# Patient Record
Sex: FEMALE | URBAN - METROPOLITAN AREA
[De-identification: names, ages, dates, MRNs, and addresses within clinical notes are randomized per-mention and may not be internally consistent; named-entity substitution may affect disease eponyms.]

---

## 2018-11-06 ENCOUNTER — APPOINTMENT (RX ONLY)
Dept: URBAN - METROPOLITAN AREA CLINIC 22 | Facility: CLINIC | Age: 28
Setting detail: DERMATOLOGY
End: 2018-11-06

## 2018-11-06 DIAGNOSIS — L83 ACANTHOSIS NIGRICANS: ICD-10-CM

## 2018-11-06 DIAGNOSIS — L20.89 OTHER ATOPIC DERMATITIS: ICD-10-CM

## 2018-11-06 DIAGNOSIS — D22 MELANOCYTIC NEVI: ICD-10-CM

## 2018-11-06 PROBLEM — D22.39 MELANOCYTIC NEVI OF OTHER PARTS OF FACE: Status: ACTIVE | Noted: 2018-11-06

## 2018-11-06 PROBLEM — L20.84 INTRINSIC (ALLERGIC) ECZEMA: Status: ACTIVE | Noted: 2018-11-06

## 2018-11-06 PROCEDURE — ? PRESCRIPTION

## 2018-11-06 PROCEDURE — ? COUNSELING

## 2018-11-06 PROCEDURE — 99203 OFFICE O/P NEW LOW 30 MIN: CPT

## 2018-11-06 RX ORDER — SELENIUM SULFIDE 1 %
SHAMPOO TOPICAL
Qty: 1 | Refills: 0 | Status: ERX | COMMUNITY
Start: 2018-11-06

## 2018-11-06 RX ORDER — MINOCYCLINE HYDROCHLORIDE 100 MG/1
CAPSULE ORAL BID
Qty: 90 | Refills: 0 | Status: ERX | COMMUNITY
Start: 2018-11-06

## 2018-11-06 RX ADMIN — MINOCYCLINE HYDROCHLORIDE: 100 CAPSULE ORAL at 21:23

## 2018-11-06 RX ADMIN — Medication: at 21:23

## 2018-11-06 ASSESSMENT — LOCATION DETAILED DESCRIPTION DERM
LOCATION DETAILED: LEFT AREOLA
LOCATION DETAILED: LEFT INFERIOR LATERAL FOREHEAD
LOCATION DETAILED: LEFT MEDIAL BREAST 7-8:00 REGION
LOCATION DETAILED: LEFT AXILLARY VAULT
LOCATION DETAILED: RIGHT AXILLARY VAULT
LOCATION DETAILED: RIGHT INFRAMAMMARY CREASE (INNER QUADRANT)
LOCATION DETAILED: RIGHT AREOLA

## 2018-11-06 ASSESSMENT — LOCATION ZONE DERM
LOCATION ZONE: FACE
LOCATION ZONE: AXILLAE
LOCATION ZONE: TRUNK

## 2018-11-06 ASSESSMENT — LOCATION SIMPLE DESCRIPTION DERM
LOCATION SIMPLE: RIGHT BREAST
LOCATION SIMPLE: LEFT FOREHEAD
LOCATION SIMPLE: LEFT BREAST
LOCATION SIMPLE: LEFT AXILLARY VAULT
LOCATION SIMPLE: RIGHT AXILLARY VAULT

## 2018-11-06 NOTE — HPI: RASH
What Type Of Note Output Would You Prefer (Optional)?: Standard Output
How Severe Is Your Rash?: mild
Is This A New Presentation, Or A Follow-Up?: Rash
Additional History: Medication helps but has not gone away.

## 2018-11-06 NOTE — PROCEDURE: COUNSELING
Detail Level: Detailed
Patient Specific Counseling (Will Not Stick From Patient To Patient): Use selsun blue shampoo in the shower daily leaving on for 5-7 minutes\\nTake minocycline twice daily
Detail Level: Zone

## 2018-11-13 ENCOUNTER — RX ONLY (OUTPATIENT)
Age: 28
Setting detail: RX ONLY
End: 2018-11-13

## 2018-11-13 RX ORDER — DOXYCYCLINE HYCLATE 100 MG/1
CAPSULE, GELATIN COATED ORAL
Qty: 120 | Refills: 0 | Status: ERX | COMMUNITY
Start: 2018-11-13

## 2018-11-14 ENCOUNTER — HOSPITAL ENCOUNTER (EMERGENCY)
Dept: HOSPITAL 8 - ED | Age: 28
Discharge: HOME | End: 2018-11-14
Payer: COMMERCIAL

## 2018-11-14 ENCOUNTER — RX ONLY (OUTPATIENT)
Age: 28
Setting detail: RX ONLY
End: 2018-11-14

## 2018-11-14 VITALS — SYSTOLIC BLOOD PRESSURE: 138 MMHG | DIASTOLIC BLOOD PRESSURE: 85 MMHG

## 2018-11-14 VITALS — BODY MASS INDEX: 28.98 KG/M2 | WEIGHT: 163.58 LBS | HEIGHT: 63 IN

## 2018-11-14 DIAGNOSIS — H93.13: Primary | ICD-10-CM

## 2018-11-14 PROCEDURE — 99281 EMR DPT VST MAYX REQ PHY/QHP: CPT

## 2018-11-14 RX ORDER — CALCIPOTRIENE 50 UG/G
CREAM TOPICAL
Qty: 1 | Refills: 1 | Status: ERX | COMMUNITY
Start: 2018-11-14

## 2018-11-14 RX ORDER — AZITHROMYCIN 250 MG/1
TABLET, FILM COATED ORAL
Qty: 30 | Refills: 0 | Status: ERX | COMMUNITY
Start: 2018-11-14

## 2019-01-10 ENCOUNTER — HOSPITAL ENCOUNTER (OUTPATIENT)
Dept: HOSPITAL 8 - LAB | Age: 29
Discharge: HOME | End: 2019-01-10
Attending: PHYSICIAN ASSISTANT
Payer: COMMERCIAL

## 2019-01-10 DIAGNOSIS — E03.9: Primary | ICD-10-CM

## 2019-01-10 LAB
T4 FREE SERPL-MCNC: 0.82 NG/DL (ref 0.76–1.46)
TSH SERPL-ACNC: 5.28 MIU/L (ref 0.36–3.74)

## 2019-01-10 PROCEDURE — 84439 ASSAY OF FREE THYROXINE: CPT

## 2019-01-10 PROCEDURE — 84443 ASSAY THYROID STIM HORMONE: CPT

## 2019-01-10 PROCEDURE — 36415 COLL VENOUS BLD VENIPUNCTURE: CPT

## 2019-01-10 PROCEDURE — 84480 ASSAY TRIIODOTHYRONINE (T3): CPT

## 2019-03-01 ENCOUNTER — HOSPITAL ENCOUNTER (OUTPATIENT)
Dept: HOSPITAL 8 - LAB | Age: 29
Discharge: HOME | End: 2019-03-01
Attending: PHYSICIAN ASSISTANT
Payer: COMMERCIAL

## 2019-03-01 DIAGNOSIS — E03.9: Primary | ICD-10-CM

## 2019-03-01 PROCEDURE — 84443 ASSAY THYROID STIM HORMONE: CPT

## 2019-03-01 PROCEDURE — 36415 COLL VENOUS BLD VENIPUNCTURE: CPT

## 2019-04-26 ENCOUNTER — HOSPITAL ENCOUNTER (OUTPATIENT)
Dept: HOSPITAL 8 - LAB | Age: 29
Discharge: HOME | End: 2019-04-26
Attending: PHYSICIAN ASSISTANT
Payer: COMMERCIAL

## 2019-04-26 DIAGNOSIS — E03.9: Primary | ICD-10-CM

## 2019-04-26 DIAGNOSIS — Z88.8: ICD-10-CM

## 2019-04-26 LAB
T4 FREE SERPL-MCNC: 1.73 NG/DL (ref 0.76–1.46)
TSH SERPL-ACNC: 0.01 MIU/L (ref 0.36–3.74)

## 2019-04-26 PROCEDURE — 84480 ASSAY TRIIODOTHYRONINE (T3): CPT

## 2019-04-26 PROCEDURE — 84443 ASSAY THYROID STIM HORMONE: CPT

## 2019-04-26 PROCEDURE — 36415 COLL VENOUS BLD VENIPUNCTURE: CPT

## 2019-04-26 PROCEDURE — 84439 ASSAY OF FREE THYROXINE: CPT

## 2019-06-13 ENCOUNTER — HOSPITAL ENCOUNTER (OUTPATIENT)
Dept: HOSPITAL 8 - LAB | Age: 29
Discharge: HOME | End: 2019-06-13
Attending: PHYSICIAN ASSISTANT
Payer: COMMERCIAL

## 2019-06-13 DIAGNOSIS — E03.9: Primary | ICD-10-CM

## 2019-06-13 LAB
T4 FREE SERPL-MCNC: 1.54 NG/DL (ref 0.76–1.46)
TSH SERPL-ACNC: 0.05 MIU/L (ref 0.36–3.74)

## 2019-06-13 PROCEDURE — 84480 ASSAY TRIIODOTHYRONINE (T3): CPT

## 2019-06-13 PROCEDURE — 84439 ASSAY OF FREE THYROXINE: CPT

## 2019-06-13 PROCEDURE — 36415 COLL VENOUS BLD VENIPUNCTURE: CPT

## 2019-06-13 PROCEDURE — 84443 ASSAY THYROID STIM HORMONE: CPT

## 2024-10-05 ENCOUNTER — HOSPITAL ENCOUNTER (OUTPATIENT)
Dept: LAB | Facility: MEDICAL CENTER | Age: 34
End: 2024-10-05
Attending: FAMILY MEDICINE
Payer: COMMERCIAL

## 2024-10-08 ENCOUNTER — HOSPITAL ENCOUNTER (OUTPATIENT)
Facility: MEDICAL CENTER | Age: 34
End: 2024-10-08
Attending: FAMILY MEDICINE
Payer: COMMERCIAL

## 2024-10-08 PROCEDURE — 87045 FECES CULTURE AEROBIC BACT: CPT

## 2024-10-08 PROCEDURE — 87177 OVA AND PARASITES SMEARS: CPT

## 2024-10-08 PROCEDURE — 87046 STOOL CULTR AEROBIC BACT EA: CPT

## 2024-10-08 PROCEDURE — 87899 AGENT NOS ASSAY W/OPTIC: CPT

## 2024-10-08 PROCEDURE — 87209 SMEAR COMPLEX STAIN: CPT

## 2024-10-11 LAB
BACTERIA STL CULT: NORMAL
E COLI SXT1+2 STL IA: NORMAL
SIGNIFICANT IND 70042: NORMAL
SITE SITE: NORMAL
SOURCE SOURCE: NORMAL

## 2024-10-15 LAB — OVA AND PARASITE, FECAL INTERPRETATION Q0595: NEGATIVE

## 2024-10-17 ENCOUNTER — HOSPITAL ENCOUNTER (OUTPATIENT)
Facility: MEDICAL CENTER | Age: 34
End: 2024-10-17
Attending: FAMILY MEDICINE
Payer: COMMERCIAL

## 2024-10-23 ENCOUNTER — HOSPITAL ENCOUNTER (OUTPATIENT)
Facility: MEDICAL CENTER | Age: 34
End: 2024-10-23
Attending: FAMILY MEDICINE
Payer: COMMERCIAL

## 2024-10-23 LAB — HEMOCCULT STL QL: POSITIVE

## 2024-10-23 PROCEDURE — 82272 OCCULT BLD FECES 1-3 TESTS: CPT

## 2025-01-16 ENCOUNTER — OCCUPATIONAL MEDICINE (OUTPATIENT)
Dept: URGENT CARE | Facility: CLINIC | Age: 35
End: 2025-01-16
Payer: COMMERCIAL

## 2025-01-16 VITALS
HEIGHT: 63 IN | RESPIRATION RATE: 19 BRPM | OXYGEN SATURATION: 97 % | DIASTOLIC BLOOD PRESSURE: 72 MMHG | SYSTOLIC BLOOD PRESSURE: 134 MMHG | TEMPERATURE: 97.6 F | BODY MASS INDEX: 28.74 KG/M2 | HEART RATE: 116 BPM | WEIGHT: 162.2 LBS

## 2025-01-16 DIAGNOSIS — M25.572 ACUTE LEFT ANKLE PAIN: ICD-10-CM

## 2025-01-16 PROCEDURE — 3078F DIAST BP <80 MM HG: CPT | Performed by: STUDENT IN AN ORGANIZED HEALTH CARE EDUCATION/TRAINING PROGRAM

## 2025-01-16 PROCEDURE — 99203 OFFICE O/P NEW LOW 30 MIN: CPT | Performed by: STUDENT IN AN ORGANIZED HEALTH CARE EDUCATION/TRAINING PROGRAM

## 2025-01-16 PROCEDURE — 3075F SYST BP GE 130 - 139MM HG: CPT | Performed by: STUDENT IN AN ORGANIZED HEALTH CARE EDUCATION/TRAINING PROGRAM

## 2025-01-16 RX ORDER — LOSARTAN POTASSIUM 100 MG/1
100 TABLET ORAL DAILY
COMMUNITY

## 2025-01-16 RX ORDER — LEVOTHYROXINE SODIUM 75 UG/1
TABLET ORAL
COMMUNITY

## 2025-01-16 ASSESSMENT — FIBROSIS 4 INDEX: FIB4 SCORE: 0.69
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
## 2025-01-17 ENCOUNTER — TELEPHONE (OUTPATIENT)
Dept: URGENT CARE | Facility: CLINIC | Age: 35
End: 2025-01-17
Payer: COMMERCIAL
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
## 2025-01-20 ENCOUNTER — OCCUPATIONAL MEDICINE (OUTPATIENT)
Dept: URGENT CARE | Facility: CLINIC | Age: 35
End: 2025-01-20
Payer: COMMERCIAL

## 2025-01-20 VITALS
WEIGHT: 162 LBS | DIASTOLIC BLOOD PRESSURE: 78 MMHG | HEIGHT: 63 IN | BODY MASS INDEX: 28.7 KG/M2 | RESPIRATION RATE: 16 BRPM | HEART RATE: 101 BPM | OXYGEN SATURATION: 97 % | TEMPERATURE: 98.8 F | SYSTOLIC BLOOD PRESSURE: 116 MMHG

## 2025-01-20 DIAGNOSIS — S99.912D INJURY OF LEFT ANKLE, SUBSEQUENT ENCOUNTER: ICD-10-CM

## 2025-01-20 PROCEDURE — 99213 OFFICE O/P EST LOW 20 MIN: CPT

## 2025-01-20 PROCEDURE — 3074F SYST BP LT 130 MM HG: CPT

## 2025-01-20 PROCEDURE — 3078F DIAST BP <80 MM HG: CPT

## 2025-01-20 ASSESSMENT — FIBROSIS 4 INDEX: FIB4 SCORE: 0.69
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
## 2025-03-13 ENCOUNTER — HOSPITAL ENCOUNTER (OUTPATIENT)
Dept: LAB | Facility: MEDICAL CENTER | Age: 35
End: 2025-03-13
Attending: FAMILY MEDICINE
Payer: COMMERCIAL

## 2025-03-13 LAB
EST. AVERAGE GLUCOSE BLD GHB EST-MCNC: 123 MG/DL
HBA1C MFR BLD: 5.9 % (ref 4–5.6)

## 2025-03-13 PROCEDURE — 84481 FREE ASSAY (FT-3): CPT

## 2025-03-13 PROCEDURE — 80061 LIPID PANEL: CPT

## 2025-03-13 PROCEDURE — 84443 ASSAY THYROID STIM HORMONE: CPT

## 2025-03-13 PROCEDURE — 83036 HEMOGLOBIN GLYCOSYLATED A1C: CPT

## 2025-03-13 PROCEDURE — 36415 COLL VENOUS BLD VENIPUNCTURE: CPT

## 2025-03-13 PROCEDURE — 84439 ASSAY OF FREE THYROXINE: CPT

## 2025-03-13 PROCEDURE — 82306 VITAMIN D 25 HYDROXY: CPT

## 2025-03-14 ENCOUNTER — APPOINTMENT (OUTPATIENT)
Dept: RADIOLOGY | Facility: MEDICAL CENTER | Age: 35
End: 2025-03-14
Attending: EMERGENCY MEDICINE
Payer: COMMERCIAL

## 2025-03-14 ENCOUNTER — HOSPITAL ENCOUNTER (EMERGENCY)
Facility: MEDICAL CENTER | Age: 35
End: 2025-03-14
Attending: EMERGENCY MEDICINE
Payer: COMMERCIAL

## 2025-03-14 ENCOUNTER — OFFICE VISIT (OUTPATIENT)
Dept: URGENT CARE | Facility: CLINIC | Age: 35
End: 2025-03-14
Payer: COMMERCIAL

## 2025-03-14 VITALS
OXYGEN SATURATION: 96 % | DIASTOLIC BLOOD PRESSURE: 81 MMHG | SYSTOLIC BLOOD PRESSURE: 125 MMHG | WEIGHT: 162.7 LBS | BODY MASS INDEX: 29.76 KG/M2 | RESPIRATION RATE: 15 BRPM | TEMPERATURE: 97.8 F | HEART RATE: 78 BPM

## 2025-03-14 VITALS
BODY MASS INDEX: 29.63 KG/M2 | OXYGEN SATURATION: 99 % | HEART RATE: 67 BPM | TEMPERATURE: 98.1 F | HEIGHT: 62 IN | SYSTOLIC BLOOD PRESSURE: 106 MMHG | WEIGHT: 161 LBS | DIASTOLIC BLOOD PRESSURE: 68 MMHG | RESPIRATION RATE: 16 BRPM

## 2025-03-14 DIAGNOSIS — R42 DIZZINESS: ICD-10-CM

## 2025-03-14 DIAGNOSIS — R42 LIGHTHEADEDNESS: ICD-10-CM

## 2025-03-14 DIAGNOSIS — R51.9 ACUTE NONINTRACTABLE HEADACHE, UNSPECIFIED HEADACHE TYPE: ICD-10-CM

## 2025-03-14 DIAGNOSIS — R41.89 BRAIN FOG: ICD-10-CM

## 2025-03-14 DIAGNOSIS — R26.89 LOSS OF BALANCE: ICD-10-CM

## 2025-03-14 DIAGNOSIS — R52 BODY ACHES: ICD-10-CM

## 2025-03-14 DIAGNOSIS — R27.8 DECREASED COORDINATION: ICD-10-CM

## 2025-03-14 DIAGNOSIS — R00.2 PALPITATIONS: ICD-10-CM

## 2025-03-14 LAB
25(OH)D3 SERPL-MCNC: 95 NG/ML (ref 30–100)
ALBUMIN SERPL BCP-MCNC: 4.3 G/DL (ref 3.2–4.9)
ALBUMIN/GLOB SERPL: 1.4 G/DL
ALP SERPL-CCNC: 65 U/L (ref 30–99)
ALT SERPL-CCNC: 13 U/L (ref 2–50)
ANION GAP SERPL CALC-SCNC: 12 MMOL/L (ref 7–16)
AST SERPL-CCNC: 22 U/L (ref 12–45)
BASOPHILS # BLD AUTO: 0.5 % (ref 0–1.8)
BASOPHILS # BLD: 0.04 K/UL (ref 0–0.12)
BILIRUB SERPL-MCNC: 0.3 MG/DL (ref 0.1–1.5)
BUN SERPL-MCNC: 17 MG/DL (ref 8–22)
CALCIUM ALBUM COR SERPL-MCNC: 9.5 MG/DL (ref 8.5–10.5)
CALCIUM SERPL-MCNC: 9.7 MG/DL (ref 8.5–10.5)
CHLORIDE SERPL-SCNC: 105 MMOL/L (ref 96–112)
CHOLEST SERPL-MCNC: 179 MG/DL (ref 100–199)
CO2 SERPL-SCNC: 24 MMOL/L (ref 20–33)
CREAT SERPL-MCNC: 0.81 MG/DL (ref 0.5–1.4)
EKG IMPRESSION: NORMAL
EOSINOPHIL # BLD AUTO: 0.23 K/UL (ref 0–0.51)
EOSINOPHIL NFR BLD: 2.8 % (ref 0–6.9)
ERYTHROCYTE [DISTWIDTH] IN BLOOD BY AUTOMATED COUNT: 36.7 FL (ref 35.9–50)
FASTING STATUS PATIENT QL REPORTED: NORMAL
GFR SERPLBLD CREATININE-BSD FMLA CKD-EPI: 96 ML/MIN/1.73 M 2
GLOBULIN SER CALC-MCNC: 3 G/DL (ref 1.9–3.5)
GLUCOSE SERPL-MCNC: 110 MG/DL (ref 65–99)
HCG SERPL QL: NEGATIVE
HCT VFR BLD AUTO: 42.5 % (ref 37–47)
HDLC SERPL-MCNC: 55 MG/DL
HGB BLD-MCNC: 14.3 G/DL (ref 12–16)
IMM GRANULOCYTES # BLD AUTO: 0.03 K/UL (ref 0–0.11)
IMM GRANULOCYTES NFR BLD AUTO: 0.4 % (ref 0–0.9)
LDLC SERPL CALC-MCNC: 97 MG/DL
LYMPHOCYTES # BLD AUTO: 2.17 K/UL (ref 1–4.8)
LYMPHOCYTES NFR BLD: 26.1 % (ref 22–41)
MAGNESIUM SERPL-MCNC: 2.2 MG/DL (ref 1.5–2.5)
MCH RBC QN AUTO: 28.5 PG (ref 27–33)
MCHC RBC AUTO-ENTMCNC: 33.6 G/DL (ref 32.2–35.5)
MCV RBC AUTO: 84.8 FL (ref 81.4–97.8)
MONOCYTES # BLD AUTO: 0.55 K/UL (ref 0–0.85)
MONOCYTES NFR BLD AUTO: 6.6 % (ref 0–13.4)
NEUTROPHILS # BLD AUTO: 5.3 K/UL (ref 1.82–7.42)
NEUTROPHILS NFR BLD: 63.6 % (ref 44–72)
NRBC # BLD AUTO: 0 K/UL
NRBC BLD-RTO: 0 /100 WBC (ref 0–0.2)
NT-PROBNP SERPL IA-MCNC: <36 PG/ML (ref 0–125)
PLATELET # BLD AUTO: 302 K/UL (ref 164–446)
PMV BLD AUTO: 10 FL (ref 9–12.9)
POTASSIUM SERPL-SCNC: 4 MMOL/L (ref 3.6–5.5)
PROT SERPL-MCNC: 7.3 G/DL (ref 6–8.2)
RBC # BLD AUTO: 5.01 M/UL (ref 4.2–5.4)
SODIUM SERPL-SCNC: 141 MMOL/L (ref 135–145)
T3FREE SERPL-MCNC: 3.43 PG/ML (ref 2–4.4)
T4 FREE SERPL-MCNC: 1.93 NG/DL (ref 0.93–1.7)
TRIGL SERPL-MCNC: 134 MG/DL (ref 0–149)
TROPONIN T SERPL-MCNC: <6 NG/L (ref 6–19)
TSH SERPL DL<=0.005 MIU/L-ACNC: 1.52 UIU/ML (ref 0.38–5.33)
TSH SERPL-ACNC: 1.41 UIU/ML (ref 0.35–5.5)
WBC # BLD AUTO: 8.3 K/UL (ref 4.8–10.8)

## 2025-03-14 PROCEDURE — 94760 N-INVAS EAR/PLS OXIMETRY 1: CPT

## 2025-03-14 PROCEDURE — 83735 ASSAY OF MAGNESIUM: CPT

## 2025-03-14 PROCEDURE — 85025 COMPLETE CBC W/AUTO DIFF WBC: CPT

## 2025-03-14 PROCEDURE — 36415 COLL VENOUS BLD VENIPUNCTURE: CPT

## 2025-03-14 PROCEDURE — 84484 ASSAY OF TROPONIN QUANT: CPT

## 2025-03-14 PROCEDURE — 99214 OFFICE O/P EST MOD 30 MIN: CPT

## 2025-03-14 PROCEDURE — 93005 ELECTROCARDIOGRAM TRACING: CPT | Mod: TC | Performed by: EMERGENCY MEDICINE

## 2025-03-14 PROCEDURE — 99283 EMERGENCY DEPT VISIT LOW MDM: CPT

## 2025-03-14 PROCEDURE — 93005 ELECTROCARDIOGRAM TRACING: CPT | Mod: TC

## 2025-03-14 PROCEDURE — 3078F DIAST BP <80 MM HG: CPT

## 2025-03-14 PROCEDURE — 80053 COMPREHEN METABOLIC PANEL: CPT

## 2025-03-14 PROCEDURE — 3074F SYST BP LT 130 MM HG: CPT

## 2025-03-14 PROCEDURE — 84443 ASSAY THYROID STIM HORMONE: CPT

## 2025-03-14 PROCEDURE — 93000 ELECTROCARDIOGRAM COMPLETE: CPT

## 2025-03-14 PROCEDURE — 84703 CHORIONIC GONADOTROPIN ASSAY: CPT

## 2025-03-14 PROCEDURE — 83880 ASSAY OF NATRIURETIC PEPTIDE: CPT

## 2025-03-14 PROCEDURE — 71045 X-RAY EXAM CHEST 1 VIEW: CPT

## 2025-03-14 RX ORDER — ONDANSETRON 4 MG/1
4 TABLET, ORALLY DISINTEGRATING ORAL
COMMUNITY
Start: 2024-09-16

## 2025-03-14 RX ORDER — RIZATRIPTAN BENZOATE 10 MG/1
TABLET ORAL
COMMUNITY
Start: 2024-09-25

## 2025-03-14 RX ORDER — CLOTRIMAZOLE AND BETAMETHASONE DIPROPIONATE 10; .64 MG/G; MG/G
CREAM TOPICAL
COMMUNITY

## 2025-03-14 RX ORDER — CIPROFLOXACIN 500 MG/1
1 TABLET, FILM COATED ORAL 2 TIMES DAILY
COMMUNITY

## 2025-03-14 RX ORDER — MECLIZINE HYDROCHLORIDE 25 MG/1
TABLET ORAL
COMMUNITY

## 2025-03-14 RX ORDER — PHENAZOPYRIDINE HYDROCHLORIDE 200 MG/1
TABLET, FILM COATED ORAL
COMMUNITY

## 2025-03-14 RX ORDER — NITROFURANTOIN 25; 75 MG/1; MG/1
1 CAPSULE ORAL 2 TIMES DAILY
COMMUNITY

## 2025-03-14 RX ORDER — SUCRALFATE 1 G/1
TABLET ORAL
COMMUNITY

## 2025-03-14 RX ORDER — OMEPRAZOLE 40 MG/1
CAPSULE, DELAYED RELEASE ORAL
COMMUNITY
Start: 2024-10-23

## 2025-03-14 ASSESSMENT — ENCOUNTER SYMPTOMS: DIZZINESS: 1

## 2025-03-14 ASSESSMENT — FIBROSIS 4 INDEX
FIB4 SCORE: 0.69
FIB4 SCORE: 0.69

## 2025-03-14 NOTE — PROGRESS NOTES
Subjective:   Jyoti Rey is a 34 y.o. female who presents for Dizziness (Dizziness, loss of balance, brain fog x 6 days)    Patient presents to the clinic for complaints of dizziness, body aches, loss of balance, palpitations, headaches, and brain fog x 6 days.  Patient has subjective weakness of the lower extremities as well.   Patient had one bout of palpitations that lasted 20 min 2 days ago when her BP went up 170s SBP. This has not recurred since.   Patient has a history of hypothyroidism. States that whenever her T4 levels are too high or too low, she experiences these symptoms. She had her T4 drawn yesterday and showed supra-therapeutic level of T4 secondary to her Levothyroxine. Took her daily synthroid today.   Has taken no medications for her current symptoms.   Patient denies chest pain, SOB, nausea/vomiting/diarrhea, difficulty breathing or swallowing, palpitations or racing heart rate,       Dizziness        Review of Systems   Neurological:  Positive for dizziness.     Refer to HPI for additional details.    During this visit, appropriate PPE was worn, and hand hygiene was performed.    PMH:  has no past medical history on file.    MEDS:   Current Outpatient Medications:     losartan (COZAAR) 100 MG Tab, Take 100 mg by mouth every day., Disp: , Rfl:     levothyroxine (SYNTHROID) 75 MCG Tab, , Disp: , Rfl:     ciprofloxacin (CIPRO) 500 MG Tab, Take 1 Tablet by mouth 2 times a day. (Patient not taking: Reported on 3/14/2025), Disp: , Rfl:     clotrimazole-betamethasone (LOTRISONE) 1-0.05 % Cream, APPLY CREAM TOPICALLY TO AFFECTED AREA TWICE DAILY FOR 2 WEEKS (Patient not taking: Reported on 3/14/2025), Disp: , Rfl:     esomeprazole (NEXIUM) 20 MG capsule, 1 capsule 1/2 to 1 hour before morning meal Orally Once a day for 30 day(s) (Patient not taking: Reported on 3/14/2025), Disp: , Rfl:     meclizine (ANTIVERT) 25 MG Tab, TAKE 1 TABLET BY MOUTH EVERY 8 HOURS AS NEEDED FOR DIZZINESS  "(Patient not taking: Reported on 3/14/2025), Disp: , Rfl:     nitrofurantoin (MACROBID) 100 MG Cap, Take 1 Capsule by mouth 2 times a day. (Patient not taking: Reported on 3/14/2025), Disp: , Rfl:     omeprazole (PRILOSEC) 40 MG delayed-release capsule, 1 capsule 1/2 to 1 hour before morning meal Orally Once a day for 30 days (Patient not taking: Reported on 3/14/2025), Disp: , Rfl:     ondansetron (ZOFRAN ODT) 4 MG TABLET DISPERSIBLE, Take 4 mg by mouth. (Patient not taking: Reported on 3/14/2025), Disp: , Rfl:     phenazopyridine (PYRIDIUM) 200 MG Tab, TAKE 1 TABLET BY MOUTH THREE TIMES DAILY FOR 2 DAYS (Patient not taking: Reported on 3/14/2025), Disp: , Rfl:     rizatriptan (MAXALT) 10 MG tablet, 1 tablet Orally Once a day As needed for headache (Patient not taking: Reported on 3/14/2025), Disp: , Rfl:     sucralfate (CARAFATE) 1 GM Tab, TAKE 1 TABLET BY MOUTH THREE TIMES DAILY FOR 5 DAYS (Patient not taking: Reported on 3/14/2025), Disp: , Rfl:     ALLERGIES:   Allergies   Allergen Reactions    Doxycycline Unspecified     tinnitus    Minocycline      SURGHX: History reviewed. No pertinent surgical history.  SOCHX:  reports that she has never smoked. She has never used smokeless tobacco. She reports that she does not drink alcohol and does not use drugs.    FH: Per HPI as applicable/pertinent.    Medications, Allergies, and current problem list reviewed today in Epic.     Objective:     /68 (BP Location: Left arm, Patient Position: Sitting, BP Cuff Size: Adult)   Pulse 67   Temp 36.7 °C (98.1 °F) (Temporal)   Resp 16   Ht 1.575 m (5' 2\")   Wt 73 kg (161 lb)   SpO2 99%     Physical Exam  Constitutional:       Appearance: Normal appearance. She is not ill-appearing or toxic-appearing.   HENT:      Head: Normocephalic.      Right Ear: Tympanic membrane, ear canal and external ear normal.      Left Ear: Tympanic membrane, ear canal and external ear normal.      Nose: Nose normal. No congestion or " rhinorrhea.      Mouth/Throat:      Mouth: Mucous membranes are moist.      Pharynx: Oropharynx is clear. No oropharyngeal exudate or posterior oropharyngeal erythema.   Eyes:      General:         Right eye: No discharge.         Left eye: No discharge.      Extraocular Movements: Extraocular movements intact.      Conjunctiva/sclera: Conjunctivae normal.      Pupils: Pupils are equal, round, and reactive to light.   Cardiovascular:      Rate and Rhythm: Normal rate and regular rhythm.      Pulses: Normal pulses.      Heart sounds: Normal heart sounds. No murmur heard.  Pulmonary:      Effort: Pulmonary effort is normal. No respiratory distress.      Breath sounds: Normal breath sounds. No wheezing or rhonchi.   Abdominal:      General: Abdomen is flat.   Musculoskeletal:         General: No signs of injury. Normal range of motion.      Cervical back: Normal range of motion. No rigidity.      Comments: Bilateral lower extremities with 5/5 strength at the knee, hip, and ankle joint, full ROM throughout as well.  2+ DTRs throughout bilateral lower extremities.  Gait is normal and coordinated.  Negative for any signs of weakness.   Lymphadenopathy:      Cervical: No cervical adenopathy.   Skin:     General: Skin is warm and dry.   Neurological:      General: No focal deficit present.      Mental Status: She is alert and oriented to person, place, and time. Mental status is at baseline.      Cranial Nerves: No cranial nerve deficit.      Motor: No weakness.      Coordination: Coordination normal.      Gait: Gait normal.      Deep Tendon Reflexes: Reflexes normal.      Comments: Thought content, mood, cognition, judgment, gait, and coordination all WNL throughout visit.   Psychiatric:         Mood and Affect: Mood normal.         Behavior: Behavior normal.         Thought Content: Thought content normal.         Judgment: Judgment normal.         Assessment/Plan:     Diagnosis and associated orders:     1.  Palpitations  - EKG - Clinic Performed    2. Lightheadedness  - CT-HEAD W/O; Future    3. Decreased coordination  - CT-HEAD W/O; Future    Other orders  - ciprofloxacin (CIPRO) 500 MG Tab; Take 1 Tablet by mouth 2 times a day. (Patient not taking: Reported on 3/14/2025)  - clotrimazole-betamethasone (LOTRISONE) 1-0.05 % Cream; APPLY CREAM TOPICALLY TO AFFECTED AREA TWICE DAILY FOR 2 WEEKS (Patient not taking: Reported on 3/14/2025)  - esomeprazole (NEXIUM) 20 MG capsule; 1 capsule 1/2 to 1 hour before morning meal Orally Once a day for 30 day(s) (Patient not taking: Reported on 3/14/2025)  - meclizine (ANTIVERT) 25 MG Tab; TAKE 1 TABLET BY MOUTH EVERY 8 HOURS AS NEEDED FOR DIZZINESS (Patient not taking: Reported on 3/14/2025)  - nitrofurantoin (MACROBID) 100 MG Cap; Take 1 Capsule by mouth 2 times a day. (Patient not taking: Reported on 3/14/2025)  - omeprazole (PRILOSEC) 40 MG delayed-release capsule; 1 capsule 1/2 to 1 hour before morning meal Orally Once a day for 30 days (Patient not taking: Reported on 3/14/2025)  - ondansetron (ZOFRAN ODT) 4 MG TABLET DISPERSIBLE; Take 4 mg by mouth. (Patient not taking: Reported on 3/14/2025)  - phenazopyridine (PYRIDIUM) 200 MG Tab; TAKE 1 TABLET BY MOUTH THREE TIMES DAILY FOR 2 DAYS (Patient not taking: Reported on 3/14/2025)  - rizatriptan (MAXALT) 10 MG tablet; 1 tablet Orally Once a day  As needed for headache (Patient not taking: Reported on 3/14/2025)  - sucralfate (CARAFATE) 1 GM Tab; TAKE 1 TABLET BY MOUTH THREE TIMES DAILY FOR 5 DAYS (Patient not taking: Reported on 3/14/2025)     Comments/MDM:     Discussed that likely etiology of the patient's symptoms is lightheadedness, palpitations, and changes to coordination likely secondary to her elevated T4 levels versus other etiologies.  Stat head CT ordered and scheduled for the patient for further workup and imaging regarding the dizziness and lightheadedness.  EKG performed in clinic to workup patient's palpitations,  which showed normal sinus rhythm without ectopy, signs of acute coronary events, or arrhythmia/dysrhythmia.  Instructed patient to stop her Synthroid at this time until she follows up with her PCP.  Discussed consideration of sending patient to the emergency department for higher level of care, workup, and treatment at this time.  Shared decision making with patient concluded with deferment and refusal of ER send of the patient at this time per patient's wishes.  Will continue workup outpatient with very low threshold for going to the ER moving forward.  Patient agreeable to this plan of care.  Strict ER precautions reviewed with patient regarding red flag symptoms listed below.  Instructed patient to follow-up with her PCP as soon as possible as well as to RTC or go to the ER if her symptoms persist or worsen.  Patient agreeable to this plan of care.  All questions answered.  Return to clinic if symptoms persist or worsen.  Red flag symptoms warranting emergency medical services discussed, including but not limited to chest pain, SOB, wheezing, difficulty breathing or swallowing, sensation of throat closing or mass in the throat, severe intractable headache, changes to vision or hearing, recurrence of palpitations or racing heart rate, abdominal pain, fever greater than 103F despite medication management, persistent or worsened coordination changes, persistent or worsened dizziness/lightheadedness/vertigo, loss of consciousness, drowsiness or lethargy, or nausea/vomiting/diarrhea.           Differential diagnosis, natural history, supportive care, and indications for immediate follow-up discussed.    Advised the patient to follow-up with the primary care physician for recheck, reevaluation, and consideration of further management.    Instructed patient to seek emergency medical attention via calling EMS or going to the Emergency Room for red flag symptoms, including but not limited to: chest pain, palpitations,  fever greater than 103F, shortness of breath, wheezing, new or worsened numbness/tingling, focal or unilateral weakness, and bloody vomit/stool.     Please note that this dictation was created using voice recognition software. I have made a reasonable attempt to correct obvious errors, but I expect that there are errors of grammar and possibly content that I did not discover before finalizing the note.    This note was electronically signed by JOSÉ Garay

## 2025-03-14 NOTE — ED TRIAGE NOTES
"Pt comes in complaining of dizziness, body aches, loss of balance, palpitations, headaches, and brain fog x 6 days. Pt went to urgent care today who sent her here for possible \"thyroid storm\"   "

## 2025-03-15 NOTE — ED NOTES
PT ambulated to RD [address] with a steady gate. C/C is dizziness, body aches, loss of balance, palpitations, headaches . Pt is in a gown, on the monitor, and call light is within reach. Chart up for ERP.

## 2025-03-15 NOTE — ED NOTES
PIV d/c. Pt provided d/c instructions and verbalizes understanding with no further questions. Home care instructions explained. Pt ambulatory to ED luh

## 2025-03-15 NOTE — ED PROVIDER NOTES
ED Provider Note    CHIEF COMPLAINT  Chief Complaint   Patient presents with    Sent from Urgent Care        [facility]    Primary care provider: Gildardo Gastelum M.D.   History obtained from: Patient  History limited by: None     Jyoti Venus Rey is a 34 y.o. female who presents to the ED with  complaining of dizziness, body aches, loss of balance, palpitations, brain fog for 6 days.  She currently denies any pain except some pain to the back of her head and neck.  She reports having palpitations that lasted about 20 minutes 2 days ago.  She denies fever/shortness of breath or difficulty breathing/nausea/vomiting/diarrhea/dysuria.  She denies possibility of pregnancy.  Patient reports history of hypothyroidism and has been taking her medication as prescribed.  Patient went to urgent care today for her symptoms and was recommended to come to the ED but she declined.    REVIEW OF SYSTEMS  Please see HPI for pertinent positives/negatives.  All other systems reviewed and are negative.     PAST MEDICAL HISTORY  No past medical history on file.     SURGICAL HISTORY  No past surgical history on file.     SOCIAL HISTORY  Social History     Tobacco Use    Smoking status: Never    Smokeless tobacco: Never   Vaping Use    Vaping status: Never Used   Substance and Sexual Activity    Alcohol use: Never    Drug use: Never    Sexual activity: Not on file        FAMILY HISTORY  No family history on file.     CURRENT MEDICATIONS  Home Medications    **Home medications have not yet been reviewed for this encounter**          ALLERGIES  Allergies   Allergen Reactions    Doxycycline Unspecified     tinnitus    Minocycline         PHYSICAL EXAM  VITAL SIGNS: /81   Pulse 78   Temp 36.6 °C (97.8 °F) (Temporal)   Resp 15   Wt 73.8 kg (162 lb 11.2 oz)   SpO2 96%   BMI 29.76 kg/m²  @CONTRERAS[[id number]::@     Pulse ox interpretation: 96% I interpret this pulse ox as  normal     Cardiac monitor interpretation: Sinus rhythm with heart rate in the 70s as interpreted by me.  The patient presented with dizziness and cardiac monitor was ordered to monitor for dysrhythmia.    Constitutional: Well developed, well nourished, alert in no apparent distress, nontoxic appearance    HENT: No external signs of trauma, normocephalic, oropharynx moist and clear   Eyes: PERRL, EOMI, vision and visual fields are grossly intact bilaterally, conjunctiva without erythema, no discharge, no icterus    Neck: Soft and supple, trachea midline, no stridor, no tenderness, no LAD, good ROM    Cardiovascular: Regular rate and rhythm, no murmurs/rubs/gallops, strong distal pulses and good perfusion    Thorax & Lungs: No respiratory distress, CTAB    Abdomen: Soft, nontender, nondistended, no guarding, no rebound, normal BS    Back: No CVAT     Extremities: No cyanosis, no edema, no gross deformity, good ROM, intact distal pulses with brisk cap refill    Skin: Warm, dry, no pallor/cyanosis, no rash noted      Neuro: Alert and oriented to person, place, and time.  GCS 15.  CN II-XII grossly intact.  Normal speech.  Equal strength bilateral UE/LE.  Sensation intact to touch.  No cerebellar signs including rapid alternating hand movements, finger-to-nose and heel-to-shin bilaterally  Psychiatric: Cooperative      NIH STROKE SCALE    1A: Level of Consciousness=0  Alert; keenly responsive 0  Arouses to minor stimulation +1  Requires repeated stimulation to arouse +2  Movements to pain +2  Postures or unresponsive +3    1B: Ask Month and Age=0  Both questions right 0  1 question right +1  0 questions right +2  Dysarthric/intubated/trauma/language barrier +1  Aphasic +2    1C: 'Blink Eyes' & 'Squeeze Hands'=0  (Pantomime commands if communication barrier)  Performs both tasks 0  Performs 1 task +1  Performs 0 tasks +2    2: Test Horizontal Extraocular Movements=0  Normal 0  Partial gaze palsy: can be overcome  +1  Partial gaze palsy: corrects with oculocephalic reflex +1  Forced gaze palsy: cannot be overcome +2    3: Test Visual Fields=0  No visual loss 0  Partial hemianopia +1  Complete hemianopia +2  Patient is bilaterally blind +3  Bilateral hemianopia +3    4: Test Facial Palsy=0  (Use grimace if obtunded)  Normal symmetry 0  Minor paralysis (flat nasolabial fold, smile asymetry) +1  Partial paralysis (lower face) +2  Unilateral complete paralysis (upper/lower face) +3  Bilateral complete paralysis (upper/lower face) +3    5A: Test Left Arm Motor Drift=0  No drift for 10 Seconds 0  Drift, but doesn't hit bed +1  Drift, hits bed +2  Some effort against gravity +2  No effort against gravity +3  No movement +4  Amputation/joint fusion 0    5B: Test Right Arm Motor Drift=0  No drift for 10 seconds 0  Drift, but doesn't hit bed +1  Drift, hits bed +2  Some effort against gravity +2  No effort against gravity +3  No movement +4  Amputation/joint fusion 0    6A: Test Left Leg Motor Drift=0  No drift for 5 Seconds 0  Drift, but doesn't hit bed +1  Drift, hits bed +2  Some effort against gravity +2  No effort against gravity +3  No movement +4  Amputation/joint fusion 0    6B: Test Right Leg Motor Drift=0  No drift for 5 Seconds 0  Drift, but doesn't hit bed +1  Drift, hits bed +2  Some effort against gravity +2  No effort against gravity +3  No movement +4  Amputation/joint fusion 0    7: Test Limb Ataxia =0  (FNF/Heel-Shin)  No ataxia 0  Ataxia in 1 limb +1  Ataxia in 2 limbs +2  Does not understand 0  Paralyzed 0  Amputation/joint fusion 0    8: Test Sensation=0  Normal; no sensory loss 0  Mild-moderate loss: less sharp/more dull +1  Mild-moderate loss: can sense being touched +1  Complete loss: cannot sense being touched at all +2  No response and quadriplegic +2  Coma/unresponsive +2    9: Test Language/Aphasia=0  Normal; no aphasia 0  Mild-moderate aphasia: some obvious changes, without significant limitation +1  Severe  aphasia: fragmentary expression, inference needed, cannot identify materials +2  Mute/global aphasia: no usable speech/auditory comprehension +3  Coma/unresponsive +3    10: Test Dysarthria=0  Normal 0  Mild-moderate dysarthria: slurring but can be understood +1  Severe dysarthria: unintelligble slurring or out of proportion to dysphasia +2  Mute/anarthric +2  Intubated/unable to test 0    11: Test Extinction/Inattention=0  No abnormality 0  Visual/tactile/auditory/spatial/personal inattention +1  Extinction to bilateral simultaneous stimulation +1  Profound maury-inattention (ex: does not recognize own hand) +2  Extinction to >1 modality +2      NIH Stroke Scale=0        DIAGNOSTIC STUDIES / PROCEDURES    EKG  12 Lead EKG obtained at 1633 and interpreted by me:   Rate: 73   Rhythm: Sinus rhythm   Ectopy: None  Intervals: Normal   Axis: Normal   QRS: Late precordial R wave transition, low voltage precordial leads  ST segments: Normal  T Waves: Normal    Clinical Impression: Sinus rhythm without acute ischemic changes or dysrhythmia       LABS  All labs reviewed by me.     Results for orders placed or performed during the hospital encounter of 25   EKG    Collection Time: 25  4:33 PM   Result Value Ref Range    Report       [facility] Emergency Dept[facility]    Test Date:  2025  Pt Name:    NELLY SOUSA              Department: ER  MRN:        [id number]                      Room:  Gender:     Female                       Technician: [id number]  :        1990                   Requested By:ER TRIAGE PROTOCOL  Order #:    [id number]                    Reading MD:    Measurements  Intervals                                Axis  Rate:       73                           P:          56  OH:         159                          QRS:        42  QRSD:       86                           T:          8  QT:         385  QTc:        425    Interpretive Statements  Sinus rhythm  Probable left atrial  enlargement  Low voltage, precordial leads  Borderline T abnormalities, anterior leads  No previous ECG available for comparison     COMP METABOLIC PANEL    Collection Time: 03/14/25  6:10 PM   Result Value Ref Range    Sodium 141 135 - 145 mmol/L    Potassium 4.0 3.6 - 5.5 mmol/L    Chloride 105 96 - 112 mmol/L    Co2 24 20 - 33 mmol/L    Anion Gap 12.0 7.0 - 16.0    Glucose 110 (H) 65 - 99 mg/dL    Bun 17 8 - 22 mg/dL    Creatinine 0.81 0.50 - 1.40 mg/dL    Calcium 9.7 8.5 - 10.5 mg/dL    Correct Calcium 9.5 8.5 - 10.5 mg/dL    AST(SGOT) 22 12 - 45 U/L    ALT(SGPT) 13 2 - 50 U/L    Alkaline Phosphatase 65 30 - 99 U/L    Total Bilirubin 0.3 0.1 - 1.5 mg/dL    Albumin 4.3 3.2 - 4.9 g/dL    Total Protein 7.3 6.0 - 8.2 g/dL    Globulin 3.0 1.9 - 3.5 g/dL    A-G Ratio 1.4 g/dL   TROPONIN    Collection Time: 03/14/25  6:10 PM   Result Value Ref Range    Troponin T <6 6 - 19 ng/L   proBrain Natriuretic Peptide, NT    Collection Time: 03/14/25  6:10 PM   Result Value Ref Range    NT-proBNP <36 0 - 125 pg/mL   MAGNESIUM    Collection Time: 03/14/25  6:10 PM   Result Value Ref Range    Magnesium 2.2 1.5 - 2.5 mg/dL   BETA-HCG QUALITATIVE SERUM    Collection Time: 03/14/25  6:10 PM   Result Value Ref Range    Beta-Hcg Qualitative Serum Negative Negative   TSH WITH REFLEX TO FT4    Collection Time: 03/14/25  6:10 PM   Result Value Ref Range    TSH 1.520 0.380 - 5.330 uIU/mL   CBC WITH DIFFERENTIAL    Collection Time: 03/14/25  6:10 PM   Result Value Ref Range    WBC 8.3 4.8 - 10.8 K/uL    RBC 5.01 4.20 - 5.40 M/uL    Hemoglobin 14.3 12.0 - 16.0 g/dL    Hematocrit 42.5 37.0 - 47.0 %    MCV 84.8 81.4 - 97.8 fL    MCH 28.5 27.0 - 33.0 pg    MCHC 33.6 32.2 - 35.5 g/dL    RDW 36.7 35.9 - 50.0 fL    Platelet Count 302 164 - 446 K/uL    MPV 10.0 9.0 - 12.9 fL    Neutrophils-Polys 63.60 44.00 - 72.00 %    Lymphocytes 26.10 22.00 - 41.00 %    Monocytes 6.60 0.00 - 13.40 %    Eosinophils 2.80 0.00 - 6.90 %    Basophils 0.50 0.00 - 1.80  %    Immature Granulocytes 0.40 0.00 - 0.90 %    Nucleated RBC 0.00 0.00 - 0.20 /100 WBC    Neutrophils (Absolute) 5.30 1.82 - 7.42 K/uL    Lymphs (Absolute) 2.17 1.00 - 4.80 K/uL    Monos (Absolute) 0.55 0.00 - 0.85 K/uL    Eos (Absolute) 0.23 0.00 - 0.51 K/uL    Baso (Absolute) 0.04 0.00 - 0.12 K/uL    Immature Granulocytes (abs) 0.03 0.00 - 0.11 K/uL    NRBC (Absolute) 0.00 K/uL   ESTIMATED GFR    Collection Time: 03/14/25  6:10 PM   Result Value Ref Range    GFR (CKD-EPI) 96 >60 mL/min/1.73 m 2        RADIOLOGY  I have independently interpreted the diagnostic imaging associated with this visit and am waiting the final reading from the radiologist.   My preliminary interpretation is as follows: No acute findings on portable chest x-ray.    DX-CHEST-PORTABLE (1 VIEW)   Final Result      No evidence of acute cardiopulmonary process.             COURSE & MEDICAL DECISION MAKING  Nursing notes, VS, PMSFHx reviewed in chart.     Review of past medical records shows the patient was seen at urgent care today for her symptoms.  Patient declined recommendation to come to the ED for evaluation.      Differential diagnoses considered include but are not limited to: Atrial fib/flutter, SVT, ventricular tachycardia, WPW, Brugada sydrome, prolonged QT syndrome, PAC, PVC, AMI, CHF, valvular heart disease, thyroid disorder, electrolyte abnormality, anxiety       ED Observation Status? No; Patient does not meet criteria for ED Observation.       Discussion of management with other [facility] or appropriate source(s): None     Escalation of care considered, and ultimately not performed: acute inpatient care management, however at this time, the patient is most appropriate for outpatient management.     Decision tools and prescription drugs considered including, but not limited to: Medication modification   .        History and physical exam as above.  This is a pleasant 34-year-old female patient with medical history of thyroid disease  who presents to the ED with above complaints.  Initial exam in the ED was benign.  No focal neurological findings or concerning features/red flags to suggest need for emergent brain imaging or LP.  EKG without acute findings and troponin without elevation.  This is unlikely to be ACS.  No concerning dysrhythmia or hemodynamic instability noted during her ED stay.  Chest x-ray without acute findings.  Laboratory testing is fairly unremarkable.  No leukocytosis or fever in the ED.  No significant anemia.  No electrolyte derangement.  She has mild hyperglycemia without evidence for DKA and this is likely stress response.  TSH is in normal range.  TSH was also in normal range yesterday with borderline free T4 elevation and free T3 in normal range.  Clinically, patient is not in thyroid storm or appears to be significantly hyperthyroid.  I discussed the findings with patient and .  On recheck, she is in no acute distress and nontoxic in appearance.  She is able to ambulate without difficulty.  I doubt that this is acute CVA, specifically posterior circulation CVA.  At this time, no convincing evidence for emergent pathology or indications for admission.  Patient was advised to hold her Synthroid for this weekend and to follow-up with her PCP next week for recheck.  Return to ED precautions given.  Patient and  verbalized understanding and agreed with plan of care with no further questions or concerns.      The patient is referred to a primary physician for blood pressure management, diabetic screening, and for all other preventative health concerns.       FINAL IMPRESSION  1. Dizziness Acute   2. Palpitations Acute   3. Body aches Acute   4. Brain fog Acute   5. Acute nonintractable headache, unspecified headache type Acute   6. Loss of balance Acute          DISPOSITION  Patient will be discharged home in stable condition.       FOLLOW UP  Gildardo Gastelum M.D.  [address] [place] [address] [address]  [address]  [place]  [address]  [phone]    Call in 3 days      [facility], Emergency Dept  [address] [facility] [address] [phone]    If symptoms worsen         OUTPATIENT MEDICATIONS  Discharge Medication List as of 3/14/2025  7:44 PM             Electronically signed by: Mal Song D.O., 3/14/2025 5:58 PM      Portions of this record were made with voice recognition software.  Despite my review, errors may remain.  Please interpret this chart in the appropriate context.

## 2025-03-15 NOTE — DISCHARGE INSTRUCTIONS
As we discussed, hold your Synthroid this weekend and please follow-up with your doctor next week for recheck of her thyroid levels.

## 2025-04-17 ENCOUNTER — OFFICE VISIT (OUTPATIENT)
Dept: MEDICAL GROUP | Facility: PHYSICIAN GROUP | Age: 35
End: 2025-04-17
Payer: COMMERCIAL

## 2025-04-17 VITALS
WEIGHT: 165.9 LBS | SYSTOLIC BLOOD PRESSURE: 122 MMHG | HEART RATE: 78 BPM | DIASTOLIC BLOOD PRESSURE: 80 MMHG | TEMPERATURE: 98.2 F | OXYGEN SATURATION: 98 % | BODY MASS INDEX: 28.32 KG/M2 | HEIGHT: 64 IN

## 2025-04-17 DIAGNOSIS — Z11.59 NEED FOR HEPATITIS C SCREENING TEST: ICD-10-CM

## 2025-04-17 DIAGNOSIS — I10 PRIMARY HYPERTENSION: ICD-10-CM

## 2025-04-17 DIAGNOSIS — E06.3 HYPOTHYROIDISM DUE TO HASHIMOTO THYROIDITIS: ICD-10-CM

## 2025-04-17 DIAGNOSIS — Z11.4 SCREENING FOR HIV WITHOUT PRESENCE OF RISK FACTORS: ICD-10-CM

## 2025-04-17 DIAGNOSIS — R09.A2 GLOBUS SENSATION: ICD-10-CM

## 2025-04-17 DIAGNOSIS — E55.9 VITAMIN D DEFICIENCY: ICD-10-CM

## 2025-04-17 DIAGNOSIS — R73.03 PREDIABETES: ICD-10-CM

## 2025-04-17 DIAGNOSIS — Z00.00 ROUTINE HEALTH MAINTENANCE: ICD-10-CM

## 2025-04-17 DIAGNOSIS — R29.898 NECK TIGHTNESS: ICD-10-CM

## 2025-04-17 DIAGNOSIS — Z76.89 ESTABLISHING CARE WITH NEW DOCTOR, ENCOUNTER FOR: ICD-10-CM

## 2025-04-17 PROCEDURE — 3074F SYST BP LT 130 MM HG: CPT | Performed by: NURSE PRACTITIONER

## 2025-04-17 PROCEDURE — 3079F DIAST BP 80-89 MM HG: CPT | Performed by: NURSE PRACTITIONER

## 2025-04-17 PROCEDURE — 99214 OFFICE O/P EST MOD 30 MIN: CPT | Performed by: NURSE PRACTITIONER

## 2025-04-17 RX ORDER — LEVOTHYROXINE SODIUM 50 UG/1
50 TABLET ORAL
COMMUNITY

## 2025-04-17 RX ORDER — LOSARTAN POTASSIUM 50 MG/1
50 TABLET ORAL DAILY
COMMUNITY

## 2025-04-17 ASSESSMENT — ENCOUNTER SYMPTOMS
HEMATOLOGIC/LYMPHATIC NEGATIVE: 1
SLEEP DISTURBANCE: 1
ALLERGIC/IMMUNOLOGIC NEGATIVE: 1
MUSCULOSKELETAL NEGATIVE: 1
PALPITATIONS: 1
EYES NEGATIVE: 1
GASTROINTESTINAL NEGATIVE: 1
ROS SKIN COMMENTS: DRY SKIN
TREMORS: 1
DIZZINESS: 1
FATIGUE: 1
RESPIRATORY NEGATIVE: 1
HEADACHES: 1

## 2025-04-17 ASSESSMENT — PATIENT HEALTH QUESTIONNAIRE - PHQ9: CLINICAL INTERPRETATION OF PHQ2 SCORE: 0

## 2025-04-17 ASSESSMENT — FIBROSIS 4 INDEX: FIB4 SCORE: 0.75

## 2025-04-17 NOTE — LETTER
[facility]  JEZ [place]oulton[place]es[place]RWeslyN.  [address]  [place] [address]  Fax: [phone]   Authorization for Release/Disclosure of   Protected Health Information   Name: JYOTI SOUSA : 1990 SSN: [id number]   Address: [address] [place] [address]  [place] [address] Phone:    There are no phone numbers on file.   I authorize the entity listed below to release/disclose the PHI below to:   [facility]/JEZ [place]oultonP.RAUDELIA. and AUBREE [place].   Provider or Entity Name:  JASON KENDALL MD   Address   City, State, [facility] [place]   Phone:      Fax:     Reason for request: continuity of care   Information to be released:    [  ] LAST COLONOSCOPY,  including any PATH REPORT and follow-up  [  ] LAST FIT/COLOGUARD RESULT [  ] LAST DEXA  [  ] LAST MAMMOGRAM  [X] LAST PAP  [  ] LAST LABS [  ] RETINA EXAM REPORT  [  ] IMMUNIZATION RECORDS  [  ] Release all info      [  ] Check here and initial the line next to each item to release ALL health information INCLUDING  _____ Care and treatment for drug and / or alcohol abuse  _____ HIV testing, infection status, or AIDS  _____ Genetic Testing    DATES OF SERVICE OR TIME PERIOD TO BE DISCLOSED: _____________  I understand and acknowledge that:  * This Authorization may be revoked at any time by you in writing, except if your health information has already been used or disclosed.  * Your health information that will be used or disclosed as a result of you signing this authorization could be re-disclosed by the recipient. If this occurs, your re-disclosed health information may no longer be protected by State or Federal laws.  * You may refuse to sign this Authorization. Your refusal will not affect your ability to obtain treatment.  * This Authorization becomes effective upon signing and will  on (date) __________.      If no date is indicated, this Authorization will  one (1) year from the signature date.    Name: Jyoti Donovan  Gloria Rey  Signature: Date:   4/17/2025     PLEASE FAX REQUESTED RECORDS BACK TO: [phone]

## 2025-04-17 NOTE — PROGRESS NOTES
Verbal consent was acquired by the patient to use [employer] ambient listening note generation during this visit Yes      Subjective   Jyoti Venus Rey is a 35 y.o. female who presents for:  History of Present Illness  The patient presents for evaluation of hypothyroidism, prediabetes, and hypertension. She is accompanied by her .    She was diagnosed with hypothyroidism in 2018 and has been experiencing symptoms such as imbalance, headaches, dizziness, fatigue, cold feet, dry skin, brain fog, and palpitations. Occasional head tremors are reported, particularly when fatigued or emotionally distressed. Interest in consulting an endocrinologist is expressed, though no consultation has occurred yet. No thyroid ultrasound has been performed. A decrease in symptoms is noted since the reduction of her levothyroxine dosage. Current medication includes levothyroxine 50 mcg.    Hypertension was diagnosed around 2022, and she is currently on losartan 50 mg.    A history of vitamin D deficiency, diagnosed 2 to 3 years ago, is noted. She takes a daily supplement of 5000 IU and undergoes annual vitamin D level checks.    In October 2024, a food poisoning incident resulted in positive blood in her stools and necessitated an emergency room visit due to hypotension and dehydration. A gastroenterologist recommended a colonoscopy and endoscopy, which she declined as her condition improved. No current gastrointestinal issues or ankle swelling are reported.    Recent neck tightness and occasional sensation of a foreign body in her throat are reported, which she attributes to anxiety.    She is up to date on her Pap smears, having seen her OB/GYN in December 2024, who advised another Pap smear after a year. Uncertainty about previous hepatitis B vaccination is noted. She has never been pregnant. She does not use e-cigarettes or vaping pens and has never been a smoker. Secondhand smoke exposure occurred with her  uncle during childhood. Alcohol consumption was limited to college years, approximately 20 years ago. No drug use is reported. She is currently sexually active and does not use any form of birth control.    SOCIAL HISTORY  She does not use e-cigarettes or vaping pens. She has never been a smoker. She had secondhand smoke exposure with her uncle before when she was younger. She does not drink alcohol. She used to drink alcohol in college about 20 years ago. She does not use any drugs. She is currently sexually active. She does not use anything for birth control. She works at [employer] as a .    FAMILY HISTORY  Her mother is alive, 60 years old, and has high blood pressure. Her father is alive, around 65 years old, and also has high blood pressure. She has one sister, 35 years old, with iron deficiency. She has one brother, 27 years old, who is healthy and active. Her maternal uncle had prostate cancer and passed away; another maternal uncle currently has prostate cancer. Her maternal aunts have high blood pressure. Her paternal cousin had cervical cancer and passed away. Her maternal grandmother  at 60 years old with diabetes, high blood pressure, and had a stroke. Her maternal grandfather  around 80 years old with Alzheimer's. Her paternal grandparents both  of heart attacks around their 80s.    Review of Systems   Constitutional:  Positive for fatigue.   HENT: Negative.     Eyes: Negative.    Respiratory: Negative.     Cardiovascular:  Positive for palpitations.   Gastrointestinal: Negative.    Endocrine: Positive for cold intolerance.   Genitourinary: Negative.    Musculoskeletal: Negative.    Skin: Negative.         Dry skin   Allergic/Immunologic: Negative.    Neurological:  Positive for dizziness, tremors and headaches.   Hematological: Negative.    Psychiatric/Behavioral:  Positive for sleep disturbance.         Brain fog     Objective   /80 (BP Location: Left arm, Patient  "Position: Sitting, BP Cuff Size: Adult)   Pulse 78   Temp 36.8 °C (98.2 °F) (Temporal)   Ht 1.626 m (5' 4\")   Wt 75.3 kg (165 lb 14.4 oz)   SpO2 98%   Physical Exam  General: Normal appearing. No distress.  HEENT: Normocephalic. Eyes conjunctiva clear lids without ptosis, pupils equal and reactive to light accommodation, ears normal shape and contour, canals are clear bilaterally, tympanic membranes are benign, nasal mucosa benign, oropharynx is without erythema, edema or exudates. Sinuses (frontal and maxillary) nontender to palpation.  Neck: Supple without JVD or bruit. Thyroid is not enlarged.  Pulmonary: Clear to ausculation.  Normal effort. No rales, rhonchi, or wheezing.  Cardiovascular: Regular rate and rhythm without murmur. Carotid and radial pulses are intact and equal bilaterally.  Abdomen: Soft, nontender, nondistended. Normal bowel sounds. Liver and spleen are not palpable.  Neurologic: Grossly nonfocal.  Lymph: No cervical, supraclavicular or axillary lymph nodes are palpable.  Skin: Warm and dry.  No obvious lesions.  Musculoskeletal: Normal gait. No extremity cyanosis, clubbing, or edema.  Psych: Normal mood and affect. Alert and oriented x3. Judgment and insight is normal.     Results  Labs   - A1c: 2025, Prediabetes range   - TSH: 2025, Normal   - Free T4: 2025, Slightly elevated   - Vitamin D: 2025, 95    Imaging   - Ultrasound of parotid glands: , Normal    Diagnostic Testing   - EK, Normal    Assessment & Plan  1. Establishing care with new doctor, encounter for    2. Hypothyroidism due to Hashimoto thyroiditis  3. Globus sensation  4. Neck tightness  New to examiner, chronic for patient.  - Reports symptoms of off-balance feeling, dizzy spells, fatigue, cold feet, dry skin, brain fog, and palpitations.  - TSH levels were normal, but free T4 was slightly elevated, leading to a decrease in levothyroxine dosage to 50 mcg from 88 mcg.  - Referral to [facility] " endocrinologist, will be initiated. A thyroid ultrasound will be ordered to investigate the reported neck tightness and sensation of a foreign body in the throat. The results will be communicated via [employer].  - Follow up in July 2025 for annual and lab review.   - Referral to Endocrinology  - US-THYROID; Future  - T3 FREE; Future  - FREE THYROXINE; Future  - TSH; Future    5. Primary hypertension  New to examiner, chronic for patient.   - Blood pressure is well-controlled on losartan 50 mg daily.  - No recent changes in blood pressure readings.  - Continue current medication regimen.  - No additional interventions required at this time.    6. Vitamin D deficiency  New to examiner, stable for patient.  - Currently taking 5000 IU of vitamin D daily and monitors her levels annually.  - Recent vitamin D level was satisfactory at 95.  - Continue current supplementation.    7. Prediabetes  New to examiner, ongoing for patient without medication.   - A1c levels were slightly elevated in March 2025, indicating a prediabetic state.  - No immediate changes in management; monitoring and follow-up planned.  - Referral to Endocrinology    8. Routine health maintenance  - Up to date on Pap smears, with the last one conducted in December 2024.  - Records of her Pap smear will be requested for chart updating.  - Hepatitis C and HIV screenings will be added to her lab orders.  - Follow-up planned for July 2025 for an annual check-up and repeat thyroid labs.    9. Need for hepatitis C screening test  Due for one time screening.  - HEP C VIRUS ANTIBODY; Future    10. Screening for HIV without presence of risk factors  Due for one time screening.   - HIV AG/AB COMBO ASSAY SCREENING; Future     Return in about 3 months (around 7/17/2025) for Preventative Annual, Follow up Labs.     Please note that this dictation was created using voice recognition software. I have made every reasonable attempt to correct obvious errors, but I expect  that there are errors of grammar and possibly content that I did not discover before finalizing the note.

## 2025-04-17 NOTE — LETTER
[facility]  KIARRA MoultonRAUDELIA.  [address]  [place] [address]  Fax: [phone]   Authorization for Release/Disclosure of   Protected Health Information   Name: JYOTI SOUSA : 1990 SSN: [id number]   Address: [address] [place] [id number] Phone:    There are no phone numbers on file.   I authorize the entity listed below to release/disclose the PHI below to:   [facility]/KIARRA MoultonRSTACEY and AUBREE Moulton.   Provider or Entity Name:  Gildardo Gastelum   Address   City, State, [facility]   Phone:      Fax:     Reason for request: continuity of care   Information to be released:    [  ] LAST COLONOSCOPY,  including any PATH REPORT and follow-up  [  ] LAST FIT/COLOGUARD RESULT [  ] LAST DEXA  [  ] LAST MAMMOGRAM  [  ] LAST PAP  [  ] LAST LABS [  ] RETINA EXAM REPORT  [  ] IMMUNIZATION RECORDS  [X] Release all info      [  ] Check here and initial the line next to each item to release ALL health information INCLUDING  _____ Care and treatment for drug and / or alcohol abuse  _____ HIV testing, infection status, or AIDS  _____ Genetic Testing    DATES OF SERVICE OR TIME PERIOD TO BE DISCLOSED: _____________  I understand and acknowledge that:  * This Authorization may be revoked at any time by you in writing, except if your health information has already been used or disclosed.  * Your health information that will be used or disclosed as a result of you signing this authorization could be re-disclosed by the recipient. If this occurs, your re-disclosed health information may no longer be protected by State or Federal laws.  * You may refuse to sign this Authorization. Your refusal will not affect your ability to obtain treatment.  * This Authorization becomes effective upon signing and will  on (date) __________.      If no date is indicated, this Authorization will  one (1) year from the signature date.    Name: Jyoti Castro  Candido  Signature: Date:   4/17/2025     PLEASE FAX REQUESTED RECORDS BACK TO: [phone]

## 2025-04-24 NOTE — Clinical Note
REFERRAL APPROVAL NOTICE         Sent on April 24, 2025                   Jyoti Rey  [address] [facility] [place] [address]                   Dear Ms. Rey,    After a careful review of the medical information and benefit coverage, [facility] has processed your referral. See below for additional details.    If applicable, you must be actively enrolled with your insurance for coverage of the authorized service. If you have any questions regarding your coverage, please contact your insurance directly.    REFERRAL INFORMATION   Referral #:  [id number]  Referred-To Department    Referred-By Provider:  Endocrinology    JOSÉ Moulton   Endocrinology [facility]      [address] [facility] [place] [address] [phone] [address] [facility], [address]  [place] [address] [phone]    Referral Start Date:  04/17/2025  Referral End Date:   04/17/2026           SCHEDULING  If you do not already have an appointment, please call [phone] to make an appointment.   MORE INFORMATION  As a reminder, [facility] ownership has changed, meaning this location is now owned and operated by [facility]. As such, we want to clarify that our patients should expect to receive two separate bills for the services received at [facility] - one representing the [facility] facility fees as the owner of the establishment, and the other to represent the physician's services and subsequent fees. You can speak with your insurance carrier for a pricing estimate by calling the customer service number on the back of your card and ask about charges for a hospital outpatient visit.  If you do not already have a [employer] account, sign up at: [email or web address]  You can access your medical information, make appointments, see lab results, billing information, and more.  If you have questions regarding this referral, please contact  the [facility] Referrals department at:              [phone]. Monday - Friday 7:30AM - 5:00PM.      Sincerely,  [facility]

## 2025-06-05 ENCOUNTER — RESULTS FOLLOW-UP (OUTPATIENT)
Dept: MEDICAL GROUP | Facility: PHYSICIAN GROUP | Age: 35
End: 2025-06-05

## 2025-06-05 ENCOUNTER — HOSPITAL ENCOUNTER (OUTPATIENT)
Dept: RADIOLOGY | Facility: MEDICAL CENTER | Age: 35
End: 2025-06-05
Attending: NURSE PRACTITIONER
Payer: COMMERCIAL

## 2025-06-05 DIAGNOSIS — R29.898 NECK TIGHTNESS: ICD-10-CM

## 2025-06-05 DIAGNOSIS — R09.A2 GLOBUS SENSATION: ICD-10-CM

## 2025-06-05 DIAGNOSIS — E06.3 HYPOTHYROIDISM DUE TO HASHIMOTO THYROIDITIS: ICD-10-CM

## 2025-06-05 PROCEDURE — 76536 US EXAM OF HEAD AND NECK: CPT

## 2025-06-16 ENCOUNTER — OFFICE VISIT (OUTPATIENT)
Dept: ENDOCRINOLOGY | Facility: MEDICAL CENTER | Age: 35
End: 2025-06-16
Attending: INTERNAL MEDICINE
Payer: COMMERCIAL

## 2025-06-16 VITALS
WEIGHT: 170 LBS | DIASTOLIC BLOOD PRESSURE: 86 MMHG | OXYGEN SATURATION: 99 % | SYSTOLIC BLOOD PRESSURE: 140 MMHG | HEIGHT: 63 IN | BODY MASS INDEX: 30.12 KG/M2 | HEART RATE: 60 BPM

## 2025-06-16 DIAGNOSIS — R00.2 PALPITATIONS: ICD-10-CM

## 2025-06-16 DIAGNOSIS — E55.9 VITAMIN D DEFICIENCY: ICD-10-CM

## 2025-06-16 DIAGNOSIS — R73.03 PREDIABETES: ICD-10-CM

## 2025-06-16 DIAGNOSIS — E06.3 HYPOTHYROIDISM DUE TO HASHIMOTO THYROIDITIS: Primary | ICD-10-CM

## 2025-06-16 LAB
HBA1C MFR BLD: 6.1 % (ref ?–5.8)
POCT INT CON NEG: NEGATIVE
POCT INT CON POS: POSITIVE

## 2025-06-16 PROCEDURE — 83036 HEMOGLOBIN GLYCOSYLATED A1C: CPT | Performed by: INTERNAL MEDICINE

## 2025-06-16 PROCEDURE — 99213 OFFICE O/P EST LOW 20 MIN: CPT | Performed by: INTERNAL MEDICINE

## 2025-06-16 RX ORDER — LEVOTHYROXINE SODIUM 50 UG/1
50 CAPSULE ORAL
Qty: 90 CAPSULE | Refills: 2 | Status: SHIPPED | OUTPATIENT
Start: 2025-06-16

## 2025-06-16 ASSESSMENT — FIBROSIS 4 INDEX: FIB4 SCORE: 0.75

## 2025-06-16 NOTE — PROGRESS NOTES
Chief Complaint: Consult requested by JOSÉ Moulton for evaluation of Hypothyroidism    HPI:     Jyoti Rey is a 35 y.o. female with Hypothyroidism diagnosed in 2018.  She reports symptoms and fatigue, brain fog and unsteady balance.  She has a family hx of thyroid disease with her materal aunt.    She used to be on Levothyroxine 75mcg  and her pcp lowered her dose to 50mcg as her free T4 was high    But she took the pill before the labs and was she was taking biotin    But she also reported palpitations on the 75mcg dsoe     Latest Reference Range & Units 03/13/25 06:36   TSH 0.350 - 5.500 uIU/mL 1.410   Free T-4 0.93 - 1.70 ng/dL 1.93 (H)   T3,Free 2.00 - 4.40 pg/mL 3.43       She is currently on Levothyroxine 50mcg daily which has been her thyroid hormone dose since 3 months.   She denies any recent dose changes. She reports Good compliance and takes her thyroid hormone daily before breakfast.  She denies taking any iron, calcium supplements or antacids.     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      ROS:     CONS:     No fever, no chills, no weight loss, no fatigue   EYES:      No diplopia, no blurry vision, no redness of eyes, no swelling of eyelids   ENT:    No hearing loss, No ear pain, No sore throat, no dysphagia, no neck swelling   CV:     No chest pain, no palpitations, no claudication, no orthopnea, no PND   PULM:    No SOB, no cough,  no hemoptysis, no wheezing    GI:   No nausea, no vomiting, no diarrhea, no constipation, no bloody stools   :  Passing urine well, no dysuria, no hematuria   ENDO:   No polyuria, no polydipsia, no heat intolerance, no cold intolerance   NEURO: No headaches, no dizziness, no convulsions, no tremors   MUSC:  No joint swellings, no arthralgias, no myalgias, no weakness   SKIN:   No rash, no ulcers, no dry skin   PSYCH:   No depression, no anxiety, no difficulty sleeping       Past Medical History:  Patient Active Problem List    Diagnosis Date Noted    Hypothyroidism due to Hashimoto thyroiditis 04/17/2025    Primary hypertension 04/17/2025    Vitamin D deficiency 04/17/2025    Prediabetes 04/17/2025       Past Surgical History:  Past Surgical History[1]     Allergies:  Doxycycline and Minocycline     Current Medications:  Current Medications[2]    Social History:  Social History     Socioeconomic History    Marital status:      Spouse name:     Number of children: 0    Years of education: Not on file    Highest education level: Not on file   Occupational History    Not on file   Tobacco Use    Smoking status: Never     Passive exposure: Past    Smokeless tobacco: Never   Vaping Use    Vaping status: Never Used   Substance and Sexual Activity    Alcohol use: Not Currently    Drug use: Never    Sexual activity: Yes     Partners: Male     Birth control/protection: None   Other Topics Concern    Not on file   Social History Narrative    Not on file     Social Drivers of Health     Financial Resource Strain: Not on file   Food Insecurity: Not on file   Transportation Needs: Not on file   Physical Activity: Not on file   Stress: Not on file   Social Connections: Not on file   Intimate Partner Violence: Not on file   Housing Stability: Not on file        Family History:   Family History   Problem Relation Age of Onset    Hypertension Mother     Hypertension Father     Other Problem (iron deficiency) Sister   "   No Known Problems Brother     Hypertension Maternal Aunt     Hypertension Maternal Aunt     Cancer Maternal Uncle     Prostate cancer Maternal Uncle     Cancer Maternal Uncle     Prostate cancer Maternal Uncle     Stroke Maternal Grandmother     Diabetes Maternal Grandmother     Hypertension Maternal Grandmother     Alzheimer's Disease Maternal Grandfather     Heart Attack Paternal Grandmother     Heart Attack Paternal Grandfather     Cancer Other     Cervical Cancer Other          PHYSICAL EXAM:   Vital signs: BP (!) 140/86 (BP Location: Left arm, Patient Position: Sitting, BP Cuff Size: Adult long)   Pulse 60   Ht 1.6 m (5' 3\")   Wt 77.1 kg (170 lb)   SpO2 99%   BMI 30.11 kg/m²   GENERAL: Well-developed, well-nourished  in no apparent distress.   EYE: No ocular and eyelid asymmetry, Anicteric sclerae,  PERRL  HENT: Hearing grossly intact, Normocephalic, atraumatic. Pink, moist mucous membranes, No exudate  NECK: Supple. Trachea midline. thyroid is normal in size without nodules or tenderness  CARDIOVASCULAR: Regular rate and rhythm. No murmurs, rubs, or gallops.   LUNGS: Clear to auscultation bilaterally   ABDOMEN: Soft, nontender with positive bowel sounds.   EXTREMITIES: No clubbing, cyanosis, or edema.   NEUROLOGICAL: Cranial nerves II-XII are grossly intact   Symmetric reflexes at the patella no proximal muscle weakness  LYMPH: No cervical, supraclavicular,  adenopathy palpated.   SKIN: No rashes, lesions. Turgor is normal.    Labs:  Lab Results   Component Value Date/Time    WBC 8.3 03/14/2025 06:10 PM    RBC 5.01 03/14/2025 06:10 PM    HEMOGLOBIN 14.3 03/14/2025 06:10 PM    MCV 84.8 03/14/2025 06:10 PM    MCH 28.5 03/14/2025 06:10 PM    MCHC 33.6 03/14/2025 06:10 PM    RDW 36.7 03/14/2025 06:10 PM    MPV 10.0 03/14/2025 06:10 PM       Lab Results   Component Value Date/Time    SODIUM 141 03/14/2025 06:10 PM    POTASSIUM 4.0 03/14/2025 06:10 PM    CHLORIDE 105 03/14/2025 06:10 PM    CO2 24 03/14/2025 " "06:10 PM    ANION 12.0 2025 06:10 PM    GLUCOSE 110 (H) 2025 06:10 PM    BUN 17 2025 06:10 PM    CREATININE 0.81 2025 06:10 PM    CALCIUM 9.7 2025 06:10 PM    ASTSGOT 22 2025 06:10 PM    ALTSGPT 13 2025 06:10 PM    TBILIRUBIN 0.3 2025 06:10 PM    ALBUMIN 4.3 2025 06:10 PM    TOTPROTEIN 7.3 2025 06:10 PM    GLOBULIN 3.0 2025 06:10 PM    AGRATIO 1.4 2025 06:10 PM       Lab Results   Component Value Date/Time    CHOLSTRLTOT 179 2025 0636    TRIGLYCERIDE 134 2025 0636    HDL 55 2025 0636    LDL 97 2025 0636       Lab Results   Component Value Date/Time    TSHULTRASEN 1.520 2025 1810     Lab Results   Component Value Date/Time    FREET4 1.93 (H) 2025 0636     Lab Results   Component Value Date/Time    FREET3 3.43 2025 0636     No results found for: \"THYSTIMIG\"    No results found for: \"MICROSOMALA\"      Imagin2025 2:30 PM     HISTORY/REASON FOR EXAM:  Dysphagia        TECHNIQUE/EXAM DESCRIPTION:  Ultrasound of the soft tissues of the head and neck.     COMPARISON:  None     FINDINGS:  The thyroid gland is homogeneous.  Vascularity is normal.     The right lobe of the thyroid gland measures 1.22 cm x 4.49 cm x 1.22 cm. The contour and echogenicity are normal. No focal mass lesions are identified.  The left lobe of the thyroid gland measures 1.09 cm x 4.39 cm x 1.30 cm. The contour and echogenicity are normal. No focal mass lesions are identified.  The isthmus measures 0.19 cm.           IMPRESSION:     Normal thyroid ultrasound.    ASSESSMENT/PLAN:     1. Hypothyroidism due to Hashimoto thyroiditis  Uncontrolled  I am going to start her on Tirosint 50 mcg daily  Reviewed advantages of gelcaps of levothyroxine in terms of absorption and thereby resulting in lower TSH levels  Will get labs in 2 to 3 months  If her TSH is still suboptimal and she still has fatigue we can try Tirosint 62.5 mcg which is a " dose that is not available without her thyroid hormone formulations  Follow-up in 6 months    2. Prediabetes  Stable  Reviewed the increased risk of developing type 2 diabetes  Discussed the importance of diet and exercise  Reviewed importance of lifestyle changes  Continue monitoring      3. Vitamin D deficiency  Stable   Vitamin D labs were reviewed with patient  Continue current supplements  Continue monitoring levels         4. Palpitations  Improved she had palpitations levothyroxine 75 they are better now that she is on 50 mcg.  We are going to try Tirosint to see if that will help maintain better TSH levels as it is better absorbed      Return in about 7 months (around 1/16/2026).       Total time spent on day of service was over 60 minutes which included obtaining a detailed history and physical exam, ordering labs, coordinating care and scheduling future follow-up    This patient during there office visit was started on new medication.  Side effects of new medications were discussed with the patient today in the office. The patient was supplied paperwork on this new medication.    Thank you kindly for allowing me to participate in the thyroid care plan for this patient.    Irving Alves MD, [facility], [facility]  06/16/25    CC:   JOSÉ Moulton         [1] History reviewed. No pertinent surgical history.  [2]   Current Outpatient Medications:     levothyroxine (SYNTHROID) 50 MCG Tab, Take 50 mcg by mouth every morning on an empty stomach. Indications: Underactive Thyroid, Disp: , Rfl:     losartan (COZAAR) 50 MG Tab, Take 50 mg by mouth every day. Indications: High Blood Pressure, Disp: , Rfl:     VITAMIN D PO, Take 5,000 Units by mouth every day., Disp: , Rfl:

## 2025-07-07 ENCOUNTER — TELEPHONE (OUTPATIENT)
Dept: HEALTH INFORMATION MANAGEMENT | Facility: OTHER | Age: 35
End: 2025-07-07
Payer: COMMERCIAL

## 2025-07-09 ENCOUNTER — PATIENT MESSAGE (OUTPATIENT)
Dept: SCHEDULING | Facility: IMAGING CENTER | Age: 35
End: 2025-07-09
Payer: COMMERCIAL

## 2025-08-01 ENCOUNTER — OFFICE VISIT (OUTPATIENT)
Dept: URGENT CARE | Facility: CLINIC | Age: 35
End: 2025-08-01
Payer: COMMERCIAL

## 2025-08-01 VITALS
DIASTOLIC BLOOD PRESSURE: 62 MMHG | HEART RATE: 103 BPM | RESPIRATION RATE: 17 BRPM | SYSTOLIC BLOOD PRESSURE: 118 MMHG | HEIGHT: 63 IN | OXYGEN SATURATION: 99 % | TEMPERATURE: 98.3 F | BODY MASS INDEX: 30.3 KG/M2 | WEIGHT: 171 LBS

## 2025-08-01 DIAGNOSIS — R35.0 FREQUENT URINATION: Primary | ICD-10-CM

## 2025-08-01 DIAGNOSIS — R73.03 PREDIABETES: ICD-10-CM

## 2025-08-01 DIAGNOSIS — N30.01 ACUTE CYSTITIS WITH HEMATURIA: ICD-10-CM

## 2025-08-01 LAB
APPEARANCE UR: CLEAR
BILIRUB UR STRIP-MCNC: NEGATIVE MG/DL
COLOR UR AUTO: YELLOW
GLUCOSE BLD-MCNC: 174 MG/DL (ref 65–99)
GLUCOSE UR STRIP.AUTO-MCNC: 250 MG/DL
KETONES UR STRIP.AUTO-MCNC: NEGATIVE MG/DL
LEUKOCYTE ESTERASE UR QL STRIP.AUTO: NORMAL
NITRITE UR QL STRIP.AUTO: NEGATIVE
PH UR STRIP.AUTO: 7 [PH] (ref 5–8)
POCT INT CON NEG: NEGATIVE
POCT INT CON POS: POSITIVE
POCT URINE PREGNANCY TEST: NEGATIVE
PROT UR QL STRIP: NEGATIVE MG/DL
RBC UR QL AUTO: NORMAL
SP GR UR STRIP.AUTO: 1
UROBILINOGEN UR STRIP-MCNC: 0.2 MG/DL

## 2025-08-01 PROCEDURE — 82962 GLUCOSE BLOOD TEST: CPT

## 2025-08-01 PROCEDURE — 99214 OFFICE O/P EST MOD 30 MIN: CPT

## 2025-08-01 PROCEDURE — 3078F DIAST BP <80 MM HG: CPT

## 2025-08-01 PROCEDURE — 81002 URINALYSIS NONAUTO W/O SCOPE: CPT

## 2025-08-01 PROCEDURE — 3074F SYST BP LT 130 MM HG: CPT

## 2025-08-01 PROCEDURE — 81025 URINE PREGNANCY TEST: CPT

## 2025-08-01 RX ORDER — NITROFURANTOIN 25; 75 MG/1; MG/1
100 CAPSULE ORAL 2 TIMES DAILY
Qty: 10 CAPSULE | Refills: 0 | Status: SHIPPED | OUTPATIENT
Start: 2025-08-01 | End: 2025-08-06

## 2025-08-01 RX ORDER — FLUCONAZOLE 150 MG/1
TABLET ORAL
Qty: 2 TABLET | Refills: 0 | Status: SHIPPED | OUTPATIENT
Start: 2025-08-01 | End: 2025-08-21

## 2025-08-01 ASSESSMENT — FIBROSIS 4 INDEX: FIB4 SCORE: 0.75

## 2025-08-08 ENCOUNTER — OFFICE VISIT (OUTPATIENT)
Dept: URGENT CARE | Facility: CLINIC | Age: 35
End: 2025-08-08
Payer: COMMERCIAL

## 2025-08-08 ENCOUNTER — HOSPITAL ENCOUNTER (OUTPATIENT)
Facility: MEDICAL CENTER | Age: 35
End: 2025-08-08
Attending: PHYSICIAN ASSISTANT
Payer: COMMERCIAL

## 2025-08-08 VITALS
DIASTOLIC BLOOD PRESSURE: 76 MMHG | BODY MASS INDEX: 29.77 KG/M2 | HEART RATE: 96 BPM | SYSTOLIC BLOOD PRESSURE: 112 MMHG | WEIGHT: 168 LBS | OXYGEN SATURATION: 97 % | RESPIRATION RATE: 16 BRPM | TEMPERATURE: 97.9 F | HEIGHT: 63 IN

## 2025-08-08 DIAGNOSIS — R73.9 ELEVATED BLOOD SUGAR: ICD-10-CM

## 2025-08-08 DIAGNOSIS — R30.0 DYSURIA: ICD-10-CM

## 2025-08-08 DIAGNOSIS — R30.0 DYSURIA: Primary | ICD-10-CM

## 2025-08-08 LAB
APPEARANCE UR: CLEAR
BILIRUB UR STRIP-MCNC: NEGATIVE MG/DL
COLOR UR AUTO: YELLOW
GLUCOSE BLD-MCNC: 113 MG/DL (ref 65–99)
GLUCOSE UR STRIP.AUTO-MCNC: NEGATIVE MG/DL
KETONES UR STRIP.AUTO-MCNC: NEGATIVE MG/DL
LEUKOCYTE ESTERASE UR QL STRIP.AUTO: NORMAL
NITRITE UR QL STRIP.AUTO: NEGATIVE
PH UR STRIP.AUTO: 6 [PH] (ref 5–8)
POCT INT CON NEG: NEGATIVE
POCT INT CON POS: POSITIVE
POCT URINE PREGNANCY TEST: NEGATIVE
PROT UR QL STRIP: NEGATIVE MG/DL
RBC UR QL AUTO: NORMAL
SP GR UR STRIP.AUTO: 1.01
UROBILINOGEN UR STRIP-MCNC: 0.2 MG/DL

## 2025-08-08 PROCEDURE — 82962 GLUCOSE BLOOD TEST: CPT | Performed by: PHYSICIAN ASSISTANT

## 2025-08-08 PROCEDURE — 3078F DIAST BP <80 MM HG: CPT | Performed by: PHYSICIAN ASSISTANT

## 2025-08-08 PROCEDURE — 99213 OFFICE O/P EST LOW 20 MIN: CPT | Performed by: PHYSICIAN ASSISTANT

## 2025-08-08 PROCEDURE — 81002 URINALYSIS NONAUTO W/O SCOPE: CPT | Performed by: PHYSICIAN ASSISTANT

## 2025-08-08 PROCEDURE — 81025 URINE PREGNANCY TEST: CPT | Performed by: PHYSICIAN ASSISTANT

## 2025-08-08 PROCEDURE — 3074F SYST BP LT 130 MM HG: CPT | Performed by: PHYSICIAN ASSISTANT

## 2025-08-08 PROCEDURE — 87086 URINE CULTURE/COLONY COUNT: CPT

## 2025-08-08 RX ORDER — FLUCONAZOLE 150 MG/1
150 TABLET ORAL DAILY
Qty: 2 TABLET | Refills: 0 | Status: SHIPPED | OUTPATIENT
Start: 2025-08-08 | End: 2025-08-21

## 2025-08-08 RX ORDER — SULFAMETHOXAZOLE AND TRIMETHOPRIM 800; 160 MG/1; MG/1
1 TABLET ORAL 2 TIMES DAILY
Qty: 6 TABLET | Refills: 0 | Status: SHIPPED | OUTPATIENT
Start: 2025-08-08 | End: 2025-08-11

## 2025-08-08 ASSESSMENT — FIBROSIS 4 INDEX: FIB4 SCORE: 0.75

## 2025-08-11 ENCOUNTER — RESULTS FOLLOW-UP (OUTPATIENT)
Dept: URGENT CARE | Facility: CLINIC | Age: 35
End: 2025-08-11
Payer: COMMERCIAL

## 2025-08-11 LAB
BACTERIA UR CULT: NORMAL
SIGNIFICANT IND 70042: NORMAL
SITE SITE: NORMAL
SOURCE SOURCE: NORMAL

## 2025-08-20 LAB
HCV IGG SERPL QL IA: NON REACTIVE
HIV 1+2 AB+HIV1 P24 AG SERPL QL IA: NON REACTIVE
T3FREE SERPL-MCNC: 3 PG/ML (ref 2–4.4)
T4 FREE SERPL-MCNC: 1.52 NG/DL (ref 0.82–1.77)
TSH SERPL DL<=0.005 MIU/L-ACNC: 3.43 UIU/ML (ref 0.45–4.5)

## 2025-08-21 ENCOUNTER — OFFICE VISIT (OUTPATIENT)
Dept: URGENT CARE | Facility: CLINIC | Age: 35
End: 2025-08-21
Payer: COMMERCIAL

## 2025-08-21 VITALS
SYSTOLIC BLOOD PRESSURE: 126 MMHG | RESPIRATION RATE: 18 BRPM | WEIGHT: 160 LBS | TEMPERATURE: 97.1 F | DIASTOLIC BLOOD PRESSURE: 88 MMHG | BODY MASS INDEX: 28.35 KG/M2 | OXYGEN SATURATION: 96 % | HEART RATE: 88 BPM | HEIGHT: 63 IN

## 2025-08-21 DIAGNOSIS — E06.3 HYPOTHYROIDISM DUE TO HASHIMOTO THYROIDITIS: Primary | ICD-10-CM

## 2025-08-21 DIAGNOSIS — R09.81 NASAL CONGESTION: Primary | ICD-10-CM

## 2025-08-21 PROCEDURE — 3074F SYST BP LT 130 MM HG: CPT

## 2025-08-21 PROCEDURE — 99214 OFFICE O/P EST MOD 30 MIN: CPT

## 2025-08-21 PROCEDURE — 3079F DIAST BP 80-89 MM HG: CPT

## 2025-08-21 RX ORDER — PREDNISONE 20 MG/1
20 TABLET ORAL DAILY
Qty: 5 TABLET | Refills: 0 | Status: SHIPPED | OUTPATIENT
Start: 2025-08-23 | End: 2025-08-28

## 2025-08-21 RX ORDER — LEVOTHYROXINE SODIUM 75 UG/1
75 CAPSULE ORAL DAILY
Qty: 90 CAPSULE | Refills: 1 | Status: SHIPPED | OUTPATIENT
Start: 2025-08-21

## 2025-08-21 ASSESSMENT — ENCOUNTER SYMPTOMS
WHEEZING: 0
SORE THROAT: 0
VOMITING: 0
PSYCHIATRIC NEGATIVE: 1
WEAKNESS: 0
CHILLS: 0
SHORTNESS OF BREATH: 0
COUGH: 0
DIARRHEA: 0
BLOOD IN STOOL: 0
SINUS PAIN: 1
DIZZINESS: 0
SPUTUM PRODUCTION: 0
NAUSEA: 0
DIAPHORESIS: 0
MYALGIAS: 0
EYE DISCHARGE: 0
ABDOMINAL PAIN: 0
BLURRED VISION: 0
HEADACHES: 1
FEVER: 0

## 2025-08-21 ASSESSMENT — FIBROSIS 4 INDEX: FIB4 SCORE: 0.75
